# Patient Record
Sex: FEMALE | Race: WHITE | URBAN - METROPOLITAN AREA
[De-identification: names, ages, dates, MRNs, and addresses within clinical notes are randomized per-mention and may not be internally consistent; named-entity substitution may affect disease eponyms.]

---

## 2023-02-14 ENCOUNTER — OFFICE VISIT (OUTPATIENT)
Dept: URBAN - METROPOLITAN AREA CLINIC 28 | Facility: CLINIC | Age: 43
End: 2023-02-14
Payer: COMMERCIAL

## 2023-02-14 DIAGNOSIS — H00.025 HORDEOLUM INTERNUM LEFT LOWER EYELID: Primary | ICD-10-CM

## 2023-02-14 PROCEDURE — 99203 OFFICE O/P NEW LOW 30 MIN: CPT | Performed by: OPTOMETRIST

## 2023-02-14 RX ORDER — DOXYCYCLINE HYCLATE 100 MG/1
100 MG CAPSULE, GELATIN COATED ORAL
Qty: 20 | Refills: 0 | Status: ACTIVE
Start: 2023-02-14

## 2023-02-14 RX ORDER — CIPROFLOXACIN HYDROCHLORIDE 3 MG/ML
0.3 % SOLUTION/ DROPS OPHTHALMIC
Qty: 0 | Refills: 0 | Status: ACTIVE
Start: 2023-02-14

## 2023-02-14 ASSESSMENT — INTRAOCULAR PRESSURE
OD: 12
OS: 12

## 2023-02-14 ASSESSMENT — KERATOMETRY
OD: 44.50
OS: 45.50

## 2023-02-14 NOTE — IMPRESSION/PLAN
Impression: Hordeolum internum left lower eyelid: H00.025. Plan: Educated one xam findings. Start doxycycline 100mg PO BID x 10 days. Also, start warm compresses TID OS and continue ciprofloxacin TID OS x 10 days. Monitor as needed or with any increase in pain, redness, or irritation.

## 2023-03-06 ENCOUNTER — OFFICE VISIT (OUTPATIENT)
Dept: URBAN - METROPOLITAN AREA CLINIC 28 | Facility: CLINIC | Age: 43
End: 2023-03-06
Payer: COMMERCIAL

## 2023-03-06 DIAGNOSIS — H00.025 HORDEOLUM INTERNUM LEFT LOWER EYELID: Primary | ICD-10-CM

## 2023-03-06 PROCEDURE — 99213 OFFICE O/P EST LOW 20 MIN: CPT | Performed by: OPTOMETRIST

## 2023-03-06 RX ORDER — NEOMYCIN, POLYMYXIN B SULFATES, DEXAMETHASONE 1; 3.5; 1 MG/G; MG/G; [USP'U]/G
OINTMENT OPHTHALMIC
Qty: 5 | Refills: 0 | Status: ACTIVE
Start: 2023-03-06

## 2023-03-06 ASSESSMENT — INTRAOCULAR PRESSURE
OD: 12
OS: 13

## 2023-03-06 NOTE — IMPRESSION/PLAN
Impression: Hordeolum internum left lower eyelid: H00.025. Plan: Educated on exam findings. No improvment with doxycycline and ciprofloxacin TID OS. Will try wilmer-poly-dex BID OS x 2 weeks and f/u with Dr. Chantel Jackson if needed.

## 2023-03-27 ENCOUNTER — OFFICE VISIT (OUTPATIENT)
Dept: URBAN - METROPOLITAN AREA CLINIC 28 | Facility: CLINIC | Age: 43
End: 2023-03-27
Payer: COMMERCIAL

## 2023-03-27 DIAGNOSIS — H25.13 AGE-RELATED NUCLEAR CATARACT, BILATERAL: ICD-10-CM

## 2023-03-27 DIAGNOSIS — H00.025 HORDEOLUM INTERNUM LEFT LOWER EYELID: Primary | ICD-10-CM

## 2023-03-27 DIAGNOSIS — K74.60 CIRRHOSIS OF LIVER: ICD-10-CM

## 2023-03-27 PROCEDURE — 99204 OFFICE O/P NEW MOD 45 MIN: CPT | Performed by: OPHTHALMOLOGY

## 2023-03-27 ASSESSMENT — INTRAOCULAR PRESSURE
OD: 12
OS: 12

## 2023-03-27 NOTE — IMPRESSION/PLAN
Impression: Hordeolum internum left lower eyelid: H00.025. Plan: Patient examined. Chart reviewed. Patient has signs, symptoms and findings consistent with chronic chalazia. This was diagrammatically described. Patient voiced understanding. Discussed known options for management (do nothing, conservative management, steroid injection where appropriate,  and/or surgical intervention.) No intervention at this time. Advised use of warm compresses, relieving stress & good water intake. RV PRN.

## 2024-11-15 ENCOUNTER — OFFICE VISIT (OUTPATIENT)
Dept: URBAN - METROPOLITAN AREA CLINIC 33 | Facility: CLINIC | Age: 44
End: 2024-11-15
Payer: COMMERCIAL

## 2024-11-15 DIAGNOSIS — H02.821 CYSTS OF RIGHT UPPER EYELID: Primary | ICD-10-CM

## 2024-11-15 PROCEDURE — 99203 OFFICE O/P NEW LOW 30 MIN: CPT

## 2024-11-15 ASSESSMENT — INTRAOCULAR PRESSURE
OD: 11
OS: 11